# Patient Record
Sex: FEMALE | Race: WHITE | Employment: FULL TIME | URBAN - METROPOLITAN AREA
[De-identification: names, ages, dates, MRNs, and addresses within clinical notes are randomized per-mention and may not be internally consistent; named-entity substitution may affect disease eponyms.]

---

## 2018-12-17 ENCOUNTER — HOSPITAL ENCOUNTER (OUTPATIENT)
Dept: PHYSICAL THERAPY | Age: 33
Discharge: HOME | End: 2018-12-17
Payer: COMMERCIAL

## 2018-12-17 NOTE — PROGRESS NOTES
Jens Dodge  : 1985  Primary: [facility]  Secondary:  [facility] [place] [address], [address], [place] [address]  [phone]   [phone]      OUTPATIENT DAILY NOTE    NAME/AGE/GENDER: Jens Dodge is a 35 y.o. female. DATE: 2018    Patient cancelled for appointment today due to  in hospital having procedure. Will plan to follow up on next scheduled visit.     Keyshawn Diaz, PT, DPT

## 2018-12-31 ENCOUNTER — HOSPITAL ENCOUNTER (OUTPATIENT)
Dept: PHYSICAL THERAPY | Age: 33
Discharge: HOME | End: 2018-12-31
Payer: COMMERCIAL

## 2018-12-31 PROCEDURE — 97110 THERAPEUTIC EXERCISES: CPT

## 2018-12-31 PROCEDURE — 97161 PT EVAL LOW COMPLEX 20 MIN: CPT

## 2018-12-31 NOTE — THERAPY EVALUATION
Bree Nolasco : 1985 Primary: [address] Secondary:  [facility] [address], [place] [address] [phone]   [phone] OUTPATIENT PHYSICAL THERAPY:Initial Assessment 2019 ICD-10: Treatment Diagnosis: R10,2 Pelvic and perineal pain, N39.46 Mixed incontinence (Urge and stress incontinence), R27.8 Lack of coordination (muscle incoordination) Precautions/Allergies:  
Patient has no allergy information on record. MD Orders: Evaluate and treat MEDICAL/REFERRING DIAGNOSIS: 
Pelvic and perineal pain [R10.2] Stress incontinence (female) (male) [N39.3] DATE OF ONSET: chronic REFERRING PHYSICIAN: Kyle Flores 
RETURN PHYSICIAN APPOINTMENT: April simple cystometrics (w/ Dr. Gopal Neal) INITIAL ASSESSMENT:  Ms. Roopa Barrientos presents with both poor endurance and overactive pelvic floor muscle (PFM) group. She has difficulty isolating and maintaining contraction of her PFM to thoroughly engage the inferior core, but also demonstrates a lack of coordination and awareness of PFM relaxation. She has significant tightness throughout all PFM layers, limiting range of motion and coordination of musculature. This, in conjunction with a abdominal wall and hip musculature, are contributing to both stress urinary incontinence (AMBROCIO) and pain during intercourse. I believe with skilled PT, with an emphasis on PFM retraining, manual therapy, core and hip stabilization, and bladder health training she can decrease pain and eliminate AMBROCIO. She is pleasant, motivated and eager to return to her prior level of function (PLOF). PROBLEM LIST (Impacting functional limitations): 1. Decreased Strength 2. Increased Pain 3. Decreased [place] with Home Exercise Program 
4. Decreased soft tissue mobility and coordination INTERVENTIONS PLANNED: 
1. Electrical Stimulation 2. Home Exercise Program (HEP) 3. Manual Therapy 4. Neuromuscular Re-education/Strengthening 5. Therapeutic Activites 6. Therapeutic Exercise/Strengthening TREATMENT PLAN: 
Effective Dates: 12/31/2018 TO 4/2/2019 (90 days). Frequency/Duration: 1 time a week for 90 Days GOALS: (Goals have been discussed and agreed upon with patient.) Short-Term Functional Goals: Time Frame: 6 weeks Pt will demonstrate I with basic PFM HEP to improve awareness, coordination, and timing of PFM. Pt will be able to maintain isolated PFM contraction for 5 seconds. Pt will demonstrate 10 quick flicks of the pelvic floor muscle group, without compensation, to implement urge suppression appropriately with urgency of urination. Discharge Goals: Time Frame: 12 weeks 1. Pt will be able to demonstrate proper use of 'the knack' in order to improve urinary control during cough/laugh/sneeze to minimize/eliminate urinary incontinence. 2. Pt will demonstrate appropriate co contraction of the Transversus Abdominus and Pelvic Floor muscle group during movement to improve core stability and participate in exercise activities 3. Pt will reports decreased pain with intercourse to less than 50%. Pt will demonstrate I with advanced core stabilization and general mobility program to facilitate carry over and I management of symptoms. Rehabilitation Potential For Stated Goals: Good Regarding Marc Elaine's therapy, I certify that the treatment plan above will be carried out by a therapist or under their direction. Thank you for this referral, Seth Ortiz PT, DPT Referring Physician Signature: ABRAHAM Hickman            Date The information in this section was collected on 12/31/2018 (except where otherwise noted). HISTORY:  
History of Present Injury/Illness (Reason for Referral): 
Pt is a 36 yo F referred to PT 2/2 stress urinary incontinence and pelvic pain with intercourse.  Pt states that she has been experiencing symptoms for about 5 years after delivery of her 3rd child. Vaginal deliveries x3; pre-eclampsia. Simple cysto April, bladder sling in June Urinary: Frequency 8x/day, 0-1x/night (rarely). Positive for stress urinary incontinence (AMBROCIO)- laughing, coughing, sneezing, physical activities, urge urinary incontinence (UUI)- occasionally, but infrequent. Pt does not use pads for protection; 0 pads per day (PPD); will have to change clothing occasionally if unable to control. Denies urgency, frequency, incomplete emptying, urinary hesitancy, dysuria, hematuria. Fluid intake: 16oz x4 water/day; bladder irritants include: coffee (on weekends), diet or zero soda (occasional) Bowel: Frequency daily. Positive for pushing/straining with BM, incomplete emptying, fluctuate between diarrhea and constipation (seeing GI). Negative for pain with bowel movement (BM), fecal incontinence. [place] stool type: variable. Use of stool softeners or laxatives? Miralax PRN Sexual: Pt is sexually active. Male partners. Positive for dyspareunia. \"it feels like a lot of pressure on my bladder and it hurts\" Pelvic Organ Prolapse/Pelvic Pain: Location: abdominal. Worse with intercourse. Max pain 7-8/10. Min pain 0/10. Past Medical History/Comorbidities: Ms. Gautam Anna  has no past medical history on file. Ms. Gautam Anna  has no past surgical history on file. Pt reports PMH to include anxiety, high blood pressure, numbness and recurrent headaches. Surgical history including tubal ligation and hysterectomy (2016). Social History/Living Environment:  
  Pt lives with  and 3 child (11, 6 and 13) Have you ever had any pelvic trauma (orthopedic in nature, fall, MVA, etc.)? No 
Have you ever experienced any unwanted physical or sexual contact? No 
Have you ever experienced any form of medical trauma (GYN, urological, GI, etc)? No 
 
Prior Level of Function/Work/Activity: 
Pt reports 5 year history of urinary leakage with physical activity. Ambulatory/Rehab Services H2 Model Falls Risk Assessment Risk Factors: 
  Ability to Rise from Chair: 
     (0)  Ability to rise in a single movement Falls Prevention Plan: No modifications necessary Total: (5 or greater = High Risk): 0  
 ©2010 [facility] Patent #[id number]. Federal Law prohibits the replication, distribution or use without written permission from [facility] [employer] Current Medications: No current outpatient medications on file. Date Last Reviewed:  1/2/2019 Number of Personal Factors/Comorbidities that affect the Plan of Care: 0: LOW COMPLEXITY EXAMINATION:  
Observation/Orthostatic Postural Assessment:   
      Normal external genitalia. Palpation:   
      Non tender with palpation via vaginal canal, however significant mm tension throughout superficial and deep PFM; minimal yielding with SP 
ROM:   
      Limited due to overactivity Strength:   
      P: Power, E: Endurance, R: Repetitions, QF: Quick Flicks, TrA: Transverse Abdominus, DB: Diaphragmatic Breathing P 2/5 E 5 seconds R 3  
QF TrA   
DB Body Structures Involved: 1. Muscles Body Functions Affected: 1. Sensory/Pain 2. Genitourinary 3. Neuromusculoskeletal 
4. Movement Related Activities and Participation Affected: 1. Learning and Applying Knowledge 2. General Tasks and Demands 3. Mobility 4. Interpersonal Interactions and Relationships Number of elements (examined above) that affect the Plan of Care: 1-2: LOW COMPLEXITY CLINICAL PRESENTATION:  
Presentation: Stable and uncomplicated: LOW COMPLEXITY CLINICAL DECISION MAKING:  
Outcome Measure:  
Pelvic Floor Impact Questionnaire (PFIQ-7) Score (out of 300) Initial: 12/31/2018 Bladder/urinary: 23 Bowel/rectum: 0 Vagina/pelvis: 0 Total: 19 Most Recent:   
Interpretation of Score:  This survey asks questions concerning certain bowel, bladder, or pelvic symptoms and how much these symptoms interfere with daily activities. Each section is scored on a 0-3 scale, 3 representing the greatest disability. The scores of each section (out of 100) are added together for a total score out of 300. Score 0 1-59  120-179 180-239 240-299 300 Modifier CH CI CJ CK CL CM CN Medical Necessity:  
· Patient is expected to demonstrate progress in strength, range of motion, coordination and functional technique to decrease pain and improve urinary control. Reason for Services/Other Comments: 
· Patient continues to require skilled intervention due to above mentioned deficits. Use of outcome tool(s) and clinical judgement create a POC that gives a: Clear prediction of patient's progress: LOW COMPLEXITY  
  
 
 
 
TREATMENT:  
(In addition to Assessment/Re-Assessment sessions the following treatments were rendered) Pre-treatment Symptoms/Complaints:  Stress urinary incontinence and painful intercours Pain: Initial:  
Pain Intensity 1: 0  Post Session:  0  
 
THERAPEUTIC EXERCISE: (10 minutes):  Exercises per grid below to improve mobility and coordination. Required moderate verbal cues to promote proper body breathing techniques and mm relaxation/isolation. Progressed resistance, range, repetitions and complexity of movement as indicated. Date: 
12/31/2018 Date: 
 Date: Activity/Exercise Parameters Parameters Parameters HEP Drops and diaphragmatic breathing- 10 minutes Treatment/Session Assessment:   
· Response to Treatment:  Pt gives verbal consent to internal vaginal assessment/treatment without chaperon present. Pt reports good understanding of plan of care, as well as prescribed home exercise program.  All questions were answered to pt's satisfaction. Pt was invited to call with any further questions or concerns. · Compliance with Program/Exercises: Will assess as treatment progresses. · Recommendations/Intent for next treatment session: \"Next visit will focus on manual therapy, downtraining, biofeedback\". Total Treatment Duration: Evaluation 40 minutes, treatment 10 minutes PT Patient Time In/Time Out Time In: 1500 Time Out: 1600 Future Appointments Date Time Provider Debra Dickinson 1/7/2019  3:00 PM Saad Somers, PT, DPT [facility]  
1/14/2019  3:00 PM Isela Orozco PT, DPT [facility]  
1/24/2019  3:00 PM [facility]  
1/31/2019  3:00 PM Is[facility]rozco PT, [facility]  
2/7/2019  3:00 PM Isela Orozco PT, DPT [facility]  
2/14/2019  3:00 PM Isela Orozco PT, DPT [facility]  
2/18/2019  3:00 PM Isela Orozco PT, DPT [facility]  
2/25/2019  3:00 PM Saad Somers, PT, DPT [facility] Roseanne Davis, PT, DPT

## 2019-01-07 ENCOUNTER — HOSPITAL ENCOUNTER (OUTPATIENT)
Dept: PHYSICAL THERAPY | Age: 34
Discharge: HOME | End: 2019-01-07

## 2019-01-07 NOTE — PROGRESS NOTES
Andrae Perez  : 1985  Primary: [facility]  Secondary: [address] at [facility]  [address], [address], [place] [address]  [phone]   [phone]      OUTPATIENT DAILY NOTE    NAME/AGE/GENDER: Andrae Perez is a 35 y.o. female. DATE: 2019    Patient cancelled for appointment today. Will plan to follow up on next scheduled visit.     Jaren Ahuja, PT, DPT

## 2019-01-14 ENCOUNTER — HOSPITAL ENCOUNTER (OUTPATIENT)
Dept: PHYSICAL THERAPY | Age: 34
Discharge: HOME | End: 2019-01-14

## 2019-01-14 NOTE — PROGRESS NOTES
Campos Capps  : 1985  Primary: [facility] [place]  Secondary: [address] [facility] at [place] [address], [address], [place] [address]  [phone]   [phone]      OUTPATIENT DAILY NOTE    NAME/AGE/GENDER: Campos Capps is a 35 y.o. female. DATE: 2019    Patient cancelled for appointment today due to no reason given. Will plan to follow up on next scheduled visit.     Mark Francis, PT, DPT

## 2019-01-24 ENCOUNTER — HOSPITAL ENCOUNTER (OUTPATIENT)
Dept: PHYSICAL THERAPY | Age: 34
Discharge: HOME | End: 2019-01-24

## 2019-01-24 NOTE — THERAPY DISCHARGE
Uvaldo Puente  : 1985  Primary: [facility] [place]  Secondary:  [facility] [address], [place] [address]  [phone]   [phone]        OUTPATIENT PHYSICAL THERAPY:Discontinuation Summary 2019    ICD-10: Treatment Diagnosis: R10,2 Pelvic and perineal pain, N39.46 Mixed incontinence (Urge and stress incontinence), R27.8 Lack of coordination (muscle incoordination)  Precautions/Allergies:   Patient has no allergy information on record. MD Orders: Evaluate and treat MEDICAL/REFERRING DIAGNOSIS:  Pelvic and perineal pain [R10.2]  Stress incontinence (female) (male) [N39.3]  DATE OF ONSET: chronic  REFERRING PHYSICIAN: Kyle Asencio  RETURN PHYSICIAN APPOINTMENT: April simple cystometrics (w/ Dr. Roman Rockwell)     INITIAL ASSESSMENT:  Ms. Dario Murray was seen for initial evaluation. She cancelled next several visits due to various reasons. Additionally pt is having some procedures done where she is unable to attend PT regularly so we have decided to hold off on therapy until she is able to come consistently. TREATMENT PLAN:  Effective Dates: 2018 TO 2019 (90 days). Frequency/Duration: 1 time a week for 90 Days  GOALS: (Goals have been discussed and agreed upon with patient.)  Short-Term Functional Goals: Time Frame: 6 weeks  Pt will demonstrate I with basic PFM HEP to improve awareness, coordination, and timing of PFM. Pt will be able to maintain isolated PFM contraction for 5 seconds. Pt will demonstrate 10 quick flicks of the pelvic floor muscle group, without compensation, to implement urge suppression appropriately with urgency of urination. Discharge Goals: Time Frame: 12 weeks  1. Pt will be able to demonstrate proper use of 'the knack' in order to improve urinary control during cough/laugh/sneeze to minimize/eliminate urinary incontinence.   2. Pt will demonstrate appropriate co contraction of the Transversus Abdominus and Pelvic Floor muscle group during movement to improve core stability and participate in exercise activities  3. Pt will reports decreased pain with intercourse to less than 50%. Pt will demonstrate I with advanced core stabilization and general mobility program to facilitate carry over and I management of symptoms.   Rehabilitation Potential For Stated Goals: Good

## 2019-01-31 ENCOUNTER — APPOINTMENT (OUTPATIENT)
Dept: PHYSICAL THERAPY | Age: 34
End: 2019-01-31

## 2019-02-07 ENCOUNTER — APPOINTMENT (OUTPATIENT)
Dept: PHYSICAL THERAPY | Age: 34
End: 2019-02-07

## 2019-02-14 ENCOUNTER — APPOINTMENT (OUTPATIENT)
Dept: PHYSICAL THERAPY | Age: 34
End: 2019-02-14

## 2019-02-18 ENCOUNTER — APPOINTMENT (OUTPATIENT)
Dept: PHYSICAL THERAPY | Age: 34
End: 2019-02-18

## 2019-02-25 ENCOUNTER — APPOINTMENT (OUTPATIENT)
Dept: PHYSICAL THERAPY | Age: 34
End: 2019-02-25